# Patient Record
Sex: MALE | Race: WHITE | NOT HISPANIC OR LATINO | Employment: FULL TIME | URBAN - NONMETROPOLITAN AREA
[De-identification: names, ages, dates, MRNs, and addresses within clinical notes are randomized per-mention and may not be internally consistent; named-entity substitution may affect disease eponyms.]

---

## 2023-05-28 ENCOUNTER — HOSPITAL ENCOUNTER (EMERGENCY)
Facility: HOSPITAL | Age: 58
Discharge: HOME | End: 2023-05-28
Attending: STUDENT IN AN ORGANIZED HEALTH CARE EDUCATION/TRAINING PROGRAM | Admitting: STUDENT IN AN ORGANIZED HEALTH CARE EDUCATION/TRAINING PROGRAM

## 2023-05-28 ENCOUNTER — APPOINTMENT (OUTPATIENT)
Dept: GENERAL RADIOLOGY | Facility: HOSPITAL | Age: 58
End: 2023-05-28

## 2023-05-28 VITALS
DIASTOLIC BLOOD PRESSURE: 76 MMHG | BODY MASS INDEX: 18.28 KG/M2 | TEMPERATURE: 98.8 F | RESPIRATION RATE: 20 BRPM | WEIGHT: 135 LBS | OXYGEN SATURATION: 98 % | SYSTOLIC BLOOD PRESSURE: 123 MMHG | HEIGHT: 72 IN | HEART RATE: 85 BPM

## 2023-05-28 DIAGNOSIS — K22.2 ESOPHAGEAL OBSTRUCTION DUE TO FOOD IMPACTION: Primary | ICD-10-CM

## 2023-05-28 DIAGNOSIS — T18.128A ESOPHAGEAL OBSTRUCTION DUE TO FOOD IMPACTION: Primary | ICD-10-CM

## 2023-05-28 DIAGNOSIS — Z98.890 HISTORY OF ESOPHAGEAL DILATATION: ICD-10-CM

## 2023-05-28 PROCEDURE — 99283 EMERGENCY DEPT VISIT LOW MDM: CPT

## 2023-05-28 PROCEDURE — 71045 X-RAY EXAM CHEST 1 VIEW: CPT

## 2023-05-28 NOTE — Clinical Note
[facility] EMERGENCY DEPARTMENT  [facility] [place] [facility] [address]  Phone: [phone]    Tong Giron was seen and treated in our emergency department on 5/28/2023.  He may return to work on 05/30/2023.         Thank you for choosing [facility].    Bhavin Healy MD

## 2023-05-28 NOTE — ED PROVIDER NOTES
"EMERGENCY DEPARTMENT ATTENDING NOTE    Patient Name: Tong Giron    Chief Complaint   Patient presents with    FOOD BOLUS       PATIENT PRESENTATION:  Tong Giron is a very pleasant 57 y.o. male prior history of esophageal food bolus presenting to the emergency department by my hands due to concern for esophageal food bolus.    Patient states that he ate about 24 hours ago he is eating steak felt like he got stuck in his throat.  States having some neck swelling.  He was unable to keep anything down including saliva.  He ultimately called EMS as his symptoms had not resolved.  He has prior history of esophageal food impaction he has had previous manometry's with a gastroenterologist in [facility].  He states that during the ambulance ride they hit a bump hard and then immediately it felt like it had gone down.  He states prior to seeing me upon arrival to the emergency permit he is now drinking entire bottle water Down had no difficulty he is not asymptomatic denies any pain in his neck any pain in his chest or any shortness of breath.      PHYSICAL EXAM:   VS: /78   Pulse 70   Temp 98.8 °F (37.1 °C) (Oral)   Resp 20   Ht 182.9 cm (72\")   Wt 61.2 kg (135 lb)   SpO2 98%   BMI 18.31 kg/m²   GENERAL: Thin but otherwise well-appearing middle-age man sitting up in stretcher no acute distress; well-nourished, well-developed, awake, alert, no acute distress, nontoxic appearing, comfortable  EYES: PERRL, sclerae anicteric, extraocular movements grossly intact, symmetric lids  EARS, NOSE, MOUTH, THROAT: atraumatic external nose and ears, moist mucous membranes  NECK: symmetric, trachea midline  RESPIRATORY: unlabored respiratory effort, clear to auscultation bilaterally, good air movement  CARDIOVASCULAR: no murmurs  GI: soft, nontender, nondistended  SKIN: warm and dry with no obvious rashes  PSYCHIATRIC: alert, pleasant and cooperative. Appropriate mood and affect.      MEDICAL DECISION MAKING:    Tong" Mack is a 57 y.o. male with history of prior esophageal food impaction presents emergency department due to concerns for esophageal food impaction.  Patient history.  It has resolved during transport with EMS.  He has significant history likely has some element of esophageal achalasia or strictures given his prior history of manometry is with his outpatient gastroenterologist in [place].  All of his vital signs are reassuring patient is completely unremarkable exam of the neck and chest.  X-ray was obtained to evaluate for pneumomediastinum although I have low suspicion that this patient had esophageal rupture from his symptoms.  Chest x-ray unremarkable.  No indication for any laboratory work-up as again as patient is completely asymptomatic his symptoms is completely resolved and given his history high suspicion that this was simply a esophageal food bolus that has now passed spontaneously into the stomach. Patient was discharged home with plan to follow-up with his outpatient control just as soon as possible for further management and given return precautions to the emergency department for recurrence or worsening of symptoms.    Imaging Ordered:   XR Chest 1 View   Final Result   1. No acute process identified.   This report was finalized on 05/28/2023 18:42 by Dr. Gisele Fuentes MD.          Internal chart review:   No past medical history on file.    No past surgical history on file.    No Known Allergies    No current facility-administered medications for this encounter.  No current outpatient medications on file.      ED Diagnosis:  Esophageal obstruction due to food impaction; History of esophageal dilatation    Disposition: to home  Follow up plan: outpatient gastroenterologist follow up within 1 week, return to ED immediately if symptoms worsen        Signed:  Bhavin Healy MD  Emergency Medicine Physician    Please note that portions of this note were completed with a voice recognition  program.      Bhavin Healy MD  05/28/23 [id number]

## 2023-05-28 NOTE — DISCHARGE INSTRUCTIONS
Today you are seen after will likely was a esophageal food obstruction that spontaneously passed during her transport.  Your x-ray was reassuring and you are having no symptoms so we are discharging you home.  Also follow close with your outpatient gastroenterologist within 1 week.  If you need a gastroenterologist you can also follow-up with one of our ones here I have attached the on-call provider you can call his number to schedule an appointment.  If your symptoms worsen prior please return to the emergency department immediately.